# Patient Record
(demographics unavailable — no encounter records)

---

## 2025-06-18 NOTE — CONSULT LETTER
[Dear  ___] : Dear  [unfilled], [Consult Letter:] : I had the pleasure of evaluating your patient, [unfilled]. [( Thank you for referring [unfilled] for consultation for _____ )] : Thank you for referring [unfilled] for consultation for [unfilled] [Please see my note below.] : Please see my note below. [Consult Closing:] : Thank you very much for allowing me to participate in the care of this patient.  If you have any questions, please do not hesitate to contact me. [Sincerely,] : Sincerely, [FreeTextEntry2] : Celine Barrera  [facility] [address]  [place] [address]  [FreeTextEntry3] : Mike Duran MD Attending [facility]

## 2025-06-18 NOTE — ASSESSMENT
[FreeTextEntry1] : She is a 63 y/o AAF with left breast LCIS incidentally found on breast reduction. We reviewed LCIS and future risk of breast cancer.  We reviewed continued surveillance with breast imaging and exam. Recently had MRI breast without any new findings. We reviewed role of chemoprevention to reduce the risk of breast cancer by 50%. We reviewed chemoprevention recommended for LCIS: tamoxifen versus raloxifene. We reviewed tamoxifen for up to 5 years. We reviewed the potential side effects of endocrine therapy including but not limited to: hot flash, GI upset, bone stiffness/ arthralgias, fatigue, and weight changes. We reviewed supportive measures to decrease these side effects. We reviewed bone health with calcium and Vitamin D supplementation. We reviewed the less than 1% risk of uterine cancer and blood thickness on tamoxifen. We reviewed her concerns for hot flashes and blood clot. We reviewed standard dose of tamoxifen 20 mg versus option of mini-dose of 5 mg as per VERDIN-01 study. We reviewed data from small 500 patient study which showed low dose tamoxifen as well tolerated compared to placebo. We reviewed dose can be modified from 20 to 5 mg if she develops intolerance. We reviewed low fat diet and exercise daily as per WINS study. Questions answered to her satisfaction. Written information about tamoxifen was given to her. She currently will continue with breast surveillance with imaging and exam. She will continue with follow up with Dr Barrera. She understands if she wants to try the tamoxifen, she will contact us.    Family history: she has father with history of prostate cancer and niece with breast cancer at age 40: her niece is getting genetic testing and she will ask her for results to determine if any family risk of cancer.

## 2025-06-18 NOTE — PHYSICAL EXAM
[Fully active, able to carry on all pre-disease performance without restriction] : Status 0 - Fully active, able to carry on all pre-disease performance without restriction [Normal] : affect appropriate [de-identified] : B breast reduction with lower breast lateral scar; no abnl masses or axillary lymph nodes palpable

## 2025-06-18 NOTE — HISTORY OF PRESENT ILLNESS
[de-identified] : Age 62: left breast LCIS  Incidental finding: she had screening mammogram on 3/20/2025 which showed no suspicious masses, architectural distortion, or significant calcifications detected. She had breast mammoplasty reduction surgery on 4/9/2025. The pathology showed right breast fibrocystic changes and left breast lobular carcinoma in situ. She saw Dr Barrera and had MRI of the breast done in 6/2025. MRI did not show any suspicious findings. Menarche at age 13 and menopause at age 50. She denies any estrogen replacement.  [de-identified] : RICHARD  [de-identified] : She is present to review chemoprevention. She has a niece who recently was diagnosed with breast cancer in her 40s and she will have genetic testing. Tolerated menopause well without any symptoms. She is in good health.